# Patient Record
Sex: FEMALE | Race: WHITE | Employment: PART TIME | URBAN - METROPOLITAN AREA
[De-identification: names, ages, dates, MRNs, and addresses within clinical notes are randomized per-mention and may not be internally consistent; named-entity substitution may affect disease eponyms.]

---

## 2017-12-27 ENCOUNTER — OFFICE VISIT (OUTPATIENT)
Dept: ENDOCRINOLOGY CLINIC | Facility: CLINIC | Age: 37
End: 2017-12-27

## 2017-12-27 VITALS
WEIGHT: 142 LBS | BODY MASS INDEX: 22.82 KG/M2 | HEIGHT: 66 IN | SYSTOLIC BLOOD PRESSURE: 131 MMHG | HEART RATE: 73 BPM | DIASTOLIC BLOOD PRESSURE: 78 MMHG

## 2017-12-27 DIAGNOSIS — E06.3 HASHIMOTO'S DISEASE: ICD-10-CM

## 2017-12-27 DIAGNOSIS — E03.9 HYPOTHYROIDISM, UNSPECIFIED TYPE: Primary | ICD-10-CM

## 2017-12-27 PROCEDURE — 99203 OFFICE O/P NEW LOW 30 MIN: CPT | Performed by: INTERNAL MEDICINE

## 2017-12-27 PROCEDURE — 99212 OFFICE O/P EST SF 10 MIN: CPT | Performed by: INTERNAL MEDICINE

## 2017-12-27 NOTE — H&P
New Patient Evaluation - History and Physical    CONSULT - Reason for Visit: Hashimotos thyroiditis   Requesting Physician: Self referral    CHIEF COMPLAINT:  Patient presents with:   Other: hashimoto's       HISTORY OF PRESENT ILLNESS:   Yolanda dillard a intolerance  Eyes: Negative for:  Visual changes, proptosis, blurring  ENT: Negative for:  dysphagia, neck swelling, dysphonia  Respiratory: Negative for:  dyspnea, cough  Cardiovascular: Negative for:  chest pain, palpitations, orthopnea  GI: Negative for will like to follow a no dairy diet since that has helped in the past.  Discussed that I will look at the labs and let her know if thyroid hormone replacement is recommended. - TSH, FT4 and TPO AB today    2.  Right thyroid nodule  Benign per patient   No

## 2018-02-24 ENCOUNTER — HOSPITAL ENCOUNTER (OUTPATIENT)
Dept: ULTRASOUND IMAGING | Age: 38
Discharge: HOME | End: 2018-02-24
Attending: INTERNAL MEDICINE
Payer: COMMERCIAL

## 2018-02-24 DIAGNOSIS — E03.9 HYPOTHYROIDISM, UNSPECIFIED TYPE: ICD-10-CM

## 2018-02-24 PROCEDURE — 76536 US EXAM OF HEAD AND NECK: CPT | Performed by: INTERNAL MEDICINE

## 2018-02-28 ENCOUNTER — TELEPHONE (OUTPATIENT)
Dept: ENDOCRINOLOGY CLINIC | Facility: CLINIC | Age: 38
End: 2018-02-28

## 2018-02-28 NOTE — TELEPHONE ENCOUNTER
Pt called for U/S results. Please call. Pt had test done on Saturday 2/24/18. Advised 3-5 days time frame on results. Aware AM out of office.

## 2018-02-28 NOTE — TELEPHONE ENCOUNTER
All results are being reviewed by [facility] in Dr. Melissa Dimas absence. Critical results are being taken care of right away. All other results are being saved for Dr. Kalyn Robles to review upon her return. Called the patient and informed her of this. She verbalized her understanding.

## 2018-03-01 NOTE — TELEPHONE ENCOUNTER
Spoke with Shanique. Informed her of results below and that AM will further review chart upon her return. Patient is OK with this. She will call the imaging center in [place] for past FNA results to be faxed to the office.

## 2018-03-01 NOTE — TELEPHONE ENCOUNTER
Spoke with AM. Reviewed thyroid US results with her. Results show bilateral thyroid nodules 1.3 cm in size. Dr. Sandhya Dominguez stated she would like to review patient's chart in more detail when she returns but results are not acute at this time. RN please let patient know of message above and let her know not acute. AM will review her chart and history in more detail when she returns and office will contact her with a plan of care. RN please route back to AM once patient has been called thanks.

## 2018-03-12 ENCOUNTER — TELEPHONE (OUTPATIENT)
Dept: ENDOCRINOLOGY CLINIC | Facility: CLINIC | Age: 38
End: 2018-03-12

## 2018-03-12 NOTE — TELEPHONE ENCOUNTER
[employer]. Informed her of Dr. Orlando Cabrera message below. She would like Dr. Dorothy Syed to review her old records first. She will have them fax over. Will await results.

## 2018-03-12 NOTE — TELEPHONE ENCOUNTER
Please let the patient know that I reviewed her thyroid US. It shows multiple thyroid nodule as follows:     Two on the right side:  0.9 x 0.4 x 0.8 cm solid appearing  nodule   1.3 x 0.6 x 1.2 cm  solid appearing nodule   within the midpole with increase

## 2018-03-27 NOTE — TELEPHONE ENCOUNTER
Records have not been received. Called the patient. She has not called to request they be sent yet. She will call and request that they be sent to our office. Provided our fax number.

## 2018-04-02 ENCOUNTER — TELEPHONE (OUTPATIENT)
Dept: ENDOCRINOLOGY CLINIC | Facility: CLINIC | Age: 38
End: 2018-04-02

## 2018-04-02 DIAGNOSIS — E04.2 MULTIPLE THYROID NODULES: Primary | ICD-10-CM

## 2018-04-03 NOTE — TELEPHONE ENCOUNTER
Spoke with Shanique. Informed her of Dr. Amrita Lees message below. She will obtain previous biopsy results and send to the office. She has the office fax number. She would prefer to repeat thyroid US in 6 months. Mailed order.  Also mailed copy of [facility] lab

## 2020-05-05 PROBLEM — N39.3 STRESS INCONTINENCE: Status: ACTIVE | Noted: 2020-05-05

## 2020-05-05 PROBLEM — R31.0 GROSS HEMATURIA: Status: ACTIVE | Noted: 2020-05-05

## 2020-05-05 PROCEDURE — 88108 CYTOPATH CONCENTRATE TECH: CPT | Performed by: UROLOGY

## 2021-12-29 ENCOUNTER — HOSPITAL ENCOUNTER (OUTPATIENT)
Age: 41
Discharge: HOME | End: 2021-12-29
Payer: COMMERCIAL

## 2021-12-29 VITALS
WEIGHT: 155 LBS | HEART RATE: 99 BPM | TEMPERATURE: 100 F | SYSTOLIC BLOOD PRESSURE: 112 MMHG | OXYGEN SATURATION: 100 % | RESPIRATION RATE: 18 BRPM | HEIGHT: 66 IN | DIASTOLIC BLOOD PRESSURE: 69 MMHG | BODY MASS INDEX: 24.91 KG/M2

## 2021-12-29 DIAGNOSIS — Z20.822 COVID-19 RULED OUT: Primary | ICD-10-CM

## 2021-12-29 DIAGNOSIS — J02.0 STREPTOCOCCAL SORE THROAT: ICD-10-CM

## 2021-12-29 LAB — S PYO AG THROAT QL: POSITIVE

## 2021-12-29 PROCEDURE — 87880 STREP A ASSAY W/OPTIC: CPT | Performed by: NURSE PRACTITIONER

## 2021-12-29 PROCEDURE — 99203 OFFICE O/P NEW LOW 30 MIN: CPT | Performed by: NURSE PRACTITIONER

## 2021-12-29 RX ORDER — IBUPROFEN 600 MG/1
600 TABLET ORAL ONCE
Status: DISCONTINUED | OUTPATIENT
Start: 2021-12-29 | End: 2021-12-29

## 2021-12-29 RX ORDER — AMOXICILLIN 500 MG/1
500 TABLET, FILM COATED ORAL 2 TIMES DAILY
Qty: 20 TABLET | Refills: 0 | Status: SHIPPED | OUTPATIENT
Start: 2021-12-29 | End: 2022-01-08

## 2021-12-29 NOTE — ED INITIAL ASSESSMENT (HPI)
Pt presents with sore throat x 5 days. Pt woke this morning with fever. Congestion with \"green mucous\"x 3 days.     No medication taken today

## 2021-12-29 NOTE — ED PROVIDER NOTES
Patient Seen in: [facility]    History   Patient presents with:  Sore Throat    Stated Complaint: Sore throat (car) [phone]    HPI    Sade Patient is a 42year old female who presents to immediate care requesting testing for COVID-19. Pulse 99   Temp 100.2 °F (37.9 °C) (Temporal)   Resp 18   Ht 167.6 cm (5' 6\")   Wt 70.3 kg   LMP 12/04/2021 (Exact Date)   SpO2 100%   BMI 25.02 kg/m²     PULSE % on RA   Sore throat exam:     VS: Vital signs reviewed.  O2 saturation within normal l [place] has issued an official mandate providing medical malpractice immunity and civil liability immunity to office issues of treating patients during the pandemic  MDM     Physical exam remained stable over serial reexaminations as previously documented

## 2021-12-31 LAB — SARS-COV-2 RNA RESP QL NAA+PROBE: DETECTED

## 2022-04-25 ENCOUNTER — HOSPITAL ENCOUNTER (OUTPATIENT)
Age: 42
Discharge: HOME | End: 2022-04-25
Payer: COMMERCIAL

## 2022-04-25 VITALS
HEART RATE: 64 BPM | WEIGHT: 155 LBS | TEMPERATURE: 98 F | SYSTOLIC BLOOD PRESSURE: 108 MMHG | HEIGHT: 66 IN | OXYGEN SATURATION: 100 % | BODY MASS INDEX: 24.91 KG/M2 | RESPIRATION RATE: 18 BRPM | DIASTOLIC BLOOD PRESSURE: 65 MMHG

## 2022-04-25 DIAGNOSIS — R05.9 COUGH: ICD-10-CM

## 2022-04-25 DIAGNOSIS — Z20.822 ENCOUNTER FOR LABORATORY TESTING FOR COVID-19 VIRUS: Primary | ICD-10-CM

## 2022-04-25 LAB — SARS-COV-2 RNA RESP QL NAA+PROBE: NOT DETECTED

## 2022-04-25 PROCEDURE — 99213 OFFICE O/P EST LOW 20 MIN: CPT | Performed by: NURSE PRACTITIONER

## 2022-04-25 PROCEDURE — U0002 COVID-19 LAB TEST NON-CDC: HCPCS | Performed by: NURSE PRACTITIONER

## 2022-04-25 RX ORDER — BENZONATATE 100 MG/1
100 CAPSULE ORAL 3 TIMES DAILY PRN
Qty: 30 CAPSULE | Refills: 0 | Status: SHIPPED | OUTPATIENT
Start: 2022-04-25 | End: 2022-05-25

## 2022-04-25 NOTE — ED INITIAL ASSESSMENT (HPI)
Patient here for a productive cough with clear to green mucus x 3 weeks. She has also been very fatigued. Denies any fevers, chills, or other symptoms. Took delsym a couple of days without any relief, no medication today.

## 2022-05-26 ENCOUNTER — HOSPITAL ENCOUNTER (OUTPATIENT)
Age: 42
Discharge: HOME | End: 2022-05-26
Payer: COMMERCIAL

## 2022-05-26 VITALS
OXYGEN SATURATION: 100 % | HEART RATE: 68 BPM | RESPIRATION RATE: 18 BRPM | SYSTOLIC BLOOD PRESSURE: 123 MMHG | TEMPERATURE: 98 F | DIASTOLIC BLOOD PRESSURE: 61 MMHG

## 2022-05-26 DIAGNOSIS — J01.90 ACUTE NON-RECURRENT SINUSITIS, UNSPECIFIED LOCATION: Primary | ICD-10-CM

## 2022-05-26 RX ORDER — AMOXICILLIN AND CLAVULANATE POTASSIUM 875; 125 MG/1; MG/1
1 TABLET, FILM COATED ORAL 2 TIMES DAILY
Qty: 14 TABLET | Refills: 0 | Status: SHIPPED | OUTPATIENT
Start: 2022-05-26 | End: 2022-06-02